# Patient Record
(demographics unavailable — no encounter records)

---

## 2024-10-15 NOTE — PHYSICAL EXAM
[Chaperone Present] : A chaperone was present in the examining room during all aspects of the physical examination [[id number]] : A chaperone was present during the pelvic exam. [Appropriately responsive] : appropriately responsive [Alert] : alert [No Acute Distress] : no acute distress [Oriented x3] : oriented x3

## 2024-10-15 NOTE — PHYSICAL EXAM
[Chaperone Present] : A chaperone was present in the examining room during all aspects of the physical examination [[id number]] : A chaperone was present during the pelvic exam. [Appropriately responsive] : appropriately responsive [Alert] : alert [No Acute Distress] : no acute distress [Oriented x3] : oriented x3

## 2024-10-22 NOTE — END OF VISIT
[FreeTextEntry3] : I, Neri Martinez solely acted as scribe for Dr. Zarina Fajardo on 10/15/2024  All medical entries made by the Scribe were at my, Dr. Larose, direction and personally dictated by me on 10/15/2024 . I have reviewed the chart and agree that the record accurately reflects my personal performance of the history, physical exam, assessment and plan. I have also personally directed, reviewed, and agreed with the chart.

## 2024-10-22 NOTE — HISTORY OF PRESENT ILLNESS
[Y] : Patient is sexually active [N] : Patient denies prior pregnancies [Menarche Age: ____] : age at menarche was [unfilled] [Currently Active] : currently active [Men] : men [BreastSonogramDate] : 02/04/2022 [TextBox_25] : Left BR3 [PapSmeardate] : 11/11/2023 [TextBox_31] : Negative [LMPDate] : 10/04/2024 [PGHxTotal] : 0 [FreeTextEntry1] : 10/04/2024

## 2024-10-22 NOTE — END OF VISIT
[FreeTextEntry3] : I, Nrei aMrtinez solely acted as scribe for Dr. Zarina Fajardo on 10/15/2024  All medical entries made by the Scribe were at my, Dr. Larose, direction and personally dictated by me on 10/15/2024 . I have reviewed the chart and agree that the record accurately reflects my personal performance of the history, physical exam, assessment and plan. I have also personally directed, reviewed, and agreed with the chart.

## 2024-11-16 NOTE — HISTORY OF PRESENT ILLNESS
[IUD] : has an intrauterine device [Y] : Patient is sexually active [N] : Patient denies prior pregnancies [Menarche Age: ____] : age at menarche was [unfilled] [No] : Patient does not have concerns regarding sex [Currently Active] : currently active [Men] : men [BreastSonogramDate] : 02/04/22 [TextBox_25] : LT BR3 [PapSmeardate] : 11/11/23 [TextBox_31] : NEG [LMPDate] : 11/01/24 [de-identified] : KYLEENA [PGHxTotal] : 0 [FreeTextEntry1] : 11/01/24

## 2024-11-16 NOTE — PLAN
[FreeTextEntry1] : FU TVUS IUD check  fu gyn annually or sooner prn call if still having insomnia- precautions for immediate attention outlined

## 2025-01-06 NOTE — HISTORY OF PRESENT ILLNESS
[IUD] : has an intrauterine device [Y] : Patient is sexually active [N] : Patient denies prior pregnancies [BreastSonogramDate] : 02/04/2022 [TextBox_25] : limited LT Breast br3 [PapSmeardate] : 11/16/2024 [TextBox_31] : neg  [GonorrheaDate] : 08/25/2024 [TextBox_63] : neg  [ChlamydiaDate] : 08/25/2024 [TextBox_68] : neg  [HPVDate] : 11/16/2024 [TextBox_78] : neg  [de-identified] : Kyleena- 10/15/2024 [PGHxTotal] : 0 [FreeTextEntry1] : hysterectomy: no d&c: no Gardasil: yes [Menarche Age: ____] : age at menarche was [unfilled] [Men] : men